# Patient Record
(demographics unavailable — no encounter records)

---

## 2025-05-16 NOTE — DISCUSSION/SUMMARY
[FreeTextEntry1] : # Health Maintenance - Cervical cancer screening: Pap smear obtained. - Breast cancer screening: Not indicated at this time. - Colon cancer screening: Not indicated at this time. - Osteoporosis screening: Not indicated at this time. - STI screening: Bloodwork ordered  # Dyspareunia  # Menorrhagia  - Bloodwork ordered - Vaginitis panel pending  - PUS ordered   # Desires pregnancy - Instructed to obtained bloodwork on Day 3 of her menses - To discuss in further detail at future visit  RTO PUS / results same day

## 2025-05-16 NOTE — HISTORY OF PRESENT ILLNESS
[N] : Patient does not use contraception [Menarche Age: ____] : age at menarche was [unfilled] [Currently Active] : currently active [HIV Test offered] : HIV Test offered [Syphilis test offered] : Syphilis test offered [Gonorrhea test offered] : Gonorrhea test offered [Chlamydia test offered] : Chlamydia test offered [Trichomonas test offered] : Trichomonas test offered [HPV test offered] : HPV test offered [Hepatitis B test offered] : Hepatitis B test offered [Hepatitis C test offered] : Hepatitis C test offered [Y] : Patient reports abnormal menses [TextBox_19] : Not indicated at this time. [TextBox_25] : Not indicated at this time. [TextBox_31] : Reports she has never had.  [TextBox_37] : Not indicated at this time. [TextBox_43] : Not indicated at this time. [LMPDate] : 4/28/25 [PGHxTotal] : 2 [[facility]ul[facility]] : 1 [[facility]] : 1 [PGHxABSpont] : 1 [FreeTextEntry1] : 4/28/25 [Men] : men [Vaginal] : vaginal [No] : No [Patient would like to be screened for STIs] : Patient would like to be screened for STIs

## 2025-05-16 NOTE — PHYSICAL EXAM
[Chaperoned Physical Exam] : A chaperone was present in the examining room during all aspects of the physical examination. [MA] : MA [FreeTextEntry2] : Temi Hamilton [Appropriately responsive] : appropriately responsive [Alert] : alert [No Acute Distress] : no acute distress [Regular Rate Rhythm] : regular rate rhythm [Soft] : soft [Non-tender] : non-tender [Non-distended] : non-distended [No HSM] : No HSM [No Lesions] : no lesions [No Mass] : no mass [Oriented x3] : oriented x3 [FreeTextEntry5] : Breathing comfortably on room air [Examination Of The Breasts] : a normal appearance [No Masses] : no breast masses were palpable [Labia Majora] : normal [Labia Minora] : normal [Normal] : normal [Uterine Adnexae] : normal